# Patient Record
Sex: FEMALE
[De-identification: names, ages, dates, MRNs, and addresses within clinical notes are randomized per-mention and may not be internally consistent; named-entity substitution may affect disease eponyms.]

---

## 2017-08-09 ENCOUNTER — TRANSCRIPTION ENCOUNTER (OUTPATIENT)
Age: 22
End: 2017-08-09

## 2019-09-19 ENCOUNTER — TRANSCRIPTION ENCOUNTER (OUTPATIENT)
Age: 24
End: 2019-09-19

## 2020-02-11 ENCOUNTER — APPOINTMENT (OUTPATIENT)
Dept: INTERNAL MEDICINE | Facility: CLINIC | Age: 25
End: 2020-02-11

## 2020-02-14 ENCOUNTER — OUTPATIENT (OUTPATIENT)
Dept: OUTPATIENT SERVICES | Facility: HOSPITAL | Age: 25
LOS: 1 days | End: 2020-02-14
Payer: COMMERCIAL

## 2020-02-14 ENCOUNTER — APPOINTMENT (OUTPATIENT)
Dept: ULTRASOUND IMAGING | Facility: IMAGING CENTER | Age: 25
End: 2020-02-14
Payer: COMMERCIAL

## 2020-02-14 DIAGNOSIS — Z00.8 ENCOUNTER FOR OTHER GENERAL EXAMINATION: ICD-10-CM

## 2020-02-14 PROCEDURE — 76642 ULTRASOUND BREAST LIMITED: CPT

## 2020-02-14 PROCEDURE — 76642 ULTRASOUND BREAST LIMITED: CPT | Mod: 26,RT

## 2020-02-14 PROCEDURE — 76641 ULTRASOUND BREAST COMPLETE: CPT

## 2020-03-06 ENCOUNTER — RESULT REVIEW (OUTPATIENT)
Age: 25
End: 2020-03-06

## 2020-03-06 ENCOUNTER — APPOINTMENT (OUTPATIENT)
Dept: ULTRASOUND IMAGING | Facility: IMAGING CENTER | Age: 25
End: 2020-03-06
Payer: COMMERCIAL

## 2020-03-06 ENCOUNTER — OUTPATIENT (OUTPATIENT)
Dept: OUTPATIENT SERVICES | Facility: HOSPITAL | Age: 25
LOS: 1 days | End: 2020-03-06
Payer: COMMERCIAL

## 2020-03-06 DIAGNOSIS — Z00.8 ENCOUNTER FOR OTHER GENERAL EXAMINATION: ICD-10-CM

## 2020-03-06 PROCEDURE — A4648: CPT

## 2020-03-06 PROCEDURE — 76641 ULTRASOUND BREAST COMPLETE: CPT | Mod: 26,50

## 2020-03-06 PROCEDURE — 19083 BX BREAST 1ST LESION US IMAG: CPT

## 2020-03-06 PROCEDURE — 88305 TISSUE EXAM BY PATHOLOGIST: CPT | Mod: 26

## 2020-03-06 PROCEDURE — 88305 TISSUE EXAM BY PATHOLOGIST: CPT

## 2020-03-06 PROCEDURE — 19083 BX BREAST 1ST LESION US IMAG: CPT | Mod: RT

## 2020-03-06 PROCEDURE — 76641 ULTRASOUND BREAST COMPLETE: CPT

## 2020-03-10 LAB — SURGICAL PATHOLOGY STUDY: SIGNIFICANT CHANGE UP

## 2020-10-22 ENCOUNTER — APPOINTMENT (OUTPATIENT)
Dept: SURGICAL ONCOLOGY | Facility: CLINIC | Age: 25
End: 2020-10-22
Payer: COMMERCIAL

## 2020-10-22 VITALS
DIASTOLIC BLOOD PRESSURE: 77 MMHG | SYSTOLIC BLOOD PRESSURE: 115 MMHG | HEIGHT: 64 IN | HEART RATE: 84 BPM | OXYGEN SATURATION: 98 % | WEIGHT: 120 LBS | BODY MASS INDEX: 20.49 KG/M2

## 2020-10-22 DIAGNOSIS — D24.9 BENIGN NEOPLASM OF UNSPECIFIED BREAST: ICD-10-CM

## 2020-10-22 PROCEDURE — 99204 OFFICE O/P NEW MOD 45 MIN: CPT

## 2020-10-22 NOTE — ASSESSMENT
[FreeTextEntry1] : We discussed options including ongoing observation and surgical resection of the fibroadenoma. Given that the lesion is no longer palpable and with minimal symptoms, Viktoriya is agreeable to short interval surveillance. I will arrange for a repeat ultrasound completed in the future. She'll contact me upon completion to discuss any pertinent findings.

## 2020-10-22 NOTE — HISTORY OF PRESENT ILLNESS
[de-identified] : Viktoriya is a pleasant 24 year-old female who presents for follow up.  She was last seen in 2016. 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   Most recent imaging consists of a sonogram of the right breast performed 3/6/2020 noting a 1:00 6 cm from nipple 9 x 7 x 9 mm fibroadenoma status post cryoablation with irregular margins.  Biopsy was recommended.  Two additional areas were noted -- 1:00 6 cm from nipple 6 x 3 x 5 mm adjacent circumscribed benign-appearing hypoechoic mass; and 8-9:00 6 cm from nipple 3 x 3 x 8 mm benign-appearing hypoechoic mass with lobular margins.  Biopsy demonstrated a necrotic fibroadenoma deemed benign and concordant.  Repeat imaging in 6 months was recommended.  \par \par Viktoriya remains without a family history of breast or ovarian cancer.  Today she is without complaints of additional palpable mass, nipple discharge.  She does note occasional premenstrual breast pain at the site of the cryoablation.  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

## 2020-10-22 NOTE — CONSULT LETTER
[Dear  ___] : Dear  [unfilled], [Consult Letter:] : I had the pleasure of evaluating your patient, [unfilled]. [Please see my note below.] : Please see my note below. [Consult Closing:] : Thank you very much for allowing me to participate in the care of this patient.  If you have any questions, please do not hesitate to contact me. [Sincerely,] : Sincerely, [FreeTextEntry2] : Ian Salazar MD [FreeTextEntry1] : Viktoriya is a pleasant 24 year-old female who presents for follow up.  She was last seen in 2016. 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   Most recent imaging consists of a sonogram of the right breast performed 3/6/2020 noting a 1:00 6 cm from nipple 9 x 7 x 9 mm fibroadenoma status post cryoablation with irregular margins.  Biopsy was recommended.  Two additional areas were noted -- 1:00 6 cm from nipple 6 x 3 x 5 mm adjacent circumscribed benign-appearing hypoechoic mass; and 8-9:00 6 cm from nipple 3 x 3 x 8 mm benign-appearing hypoechoic mass with lobular margins.  Biopsy demonstrated a necrotic fibroadenoma deemed benign and concordant.  Repeat imaging in 6 months was recommended.  \par Viktoriya remains without a family history of breast or ovarian cancer.  Today she is without complaints of additional palpable mass, nipple discharge.  She does note occasional premenstrual breast pain at the site of the cryoablation.\par \par On exam, both breasts are without any dominant masses. There is mild nodularity but no discrete palpable mass at the site of the previous cryoablation. There is no axillary adenopathy on either side.\par \par We discussed options including ongoing observation and surgical resection of the fibroadenoma. Given that the lesion is no longer palpable and with minimal symptoms, Viktoriya is agreeable to short interval surveillance. I will arrange for a repeat ultrasound completed in the future. She'll contact me upon completion to discuss any pertinent findings. [FreeTextEntry3] : Tariq Jhaveri MD\par Surgical Oncology\par [facility]\par Office: [phone]\par Cell: [phone]\par

## 2021-08-06 NOTE — PHYSICAL EXAM
[FreeTextEntry1] : Medical staff ( [facility]  ) present during exam\par  [Normal] : supple, no neck mass and thyroid not enlarged [Normal Neck Lymph Nodes] : normal neck lymph nodes  [Normal Supraclavicular Lymph Nodes] : normal supraclavicular lymph nodes [Normal Groin Lymph Nodes] : normal groin lymph nodes [Normal Axillary Lymph Nodes] : normal axillary lymph nodes [Normal] : oriented to person, place and time, with appropriate affect [de-identified] : both breasts are without any dominant masses. There is mild nodularity but no discrete palpable mass at the site of the previous cryoablation. There is no axillary adenopathy on either side.\par  Unable to assess